# Patient Record
Sex: MALE
[De-identification: names, ages, dates, MRNs, and addresses within clinical notes are randomized per-mention and may not be internally consistent; named-entity substitution may affect disease eponyms.]

---

## 2017-10-30 ENCOUNTER — CHARTING TRANS (OUTPATIENT)
Dept: OTHER | Age: 47
End: 2017-10-30

## 2017-10-30 ASSESSMENT — PAIN SCALES - GENERAL: PAINLEVEL_OUTOF10: 0

## 2017-12-02 ENCOUNTER — LAB SERVICES (OUTPATIENT)
Dept: OTHER | Age: 47
End: 2017-12-02

## 2017-12-21 LAB
ALBUMIN SERPL-MCNC: 4 G/DL (ref 3.6–5.1)
ALBUMIN/GLOB SERPL: 1.1 (ref 1–2.4)
ALP SERPL-CCNC: 56 UNITS/L (ref 45–117)
ALT SERPL-CCNC: 36 UNITS/L
ANION GAP SERPL CALC-SCNC: 12 MMOL/L (ref 10–20)
APPEARANCE UR: CLEAR
AST SERPL-CCNC: 21 UNITS/L
BACTERIA #/AREA URNS HPF: ABNORMAL /HPF
BACTERIA UR CULT: NORMAL
BASOPHILS # BLD: 0 K/MCL (ref 0–0.3)
BASOPHILS NFR BLD: 0 %
BILIRUB SERPL-MCNC: 1.1 MG/DL (ref 0.2–1)
BILIRUB UR QL: NEGATIVE
BUN SERPL-MCNC: 11 MG/DL (ref 6–20)
BUN/CREAT SERPL: 16 (ref 7–25)
CALCIUM SERPL-MCNC: 8.7 MG/DL (ref 8.4–10.2)
CHLORIDE SERPL-SCNC: 104 MMOL/L (ref 98–107)
CHOLEST SERPL-MCNC: 201 MG/DL
CHOLEST/HDLC SERPL: 4.1
CO2 SERPL-SCNC: 27 MMOL/L (ref 21–32)
COLOR UR: ABNORMAL
CREAT SERPL-MCNC: 0.7 MG/DL (ref 0.67–1.17)
DIFFERENTIAL METHOD BLD: ABNORMAL
EOSINOPHIL # BLD: 0.1 K/MCL (ref 0.1–0.5)
EOSINOPHIL NFR BLD: 1 %
ERYTHROCYTE [DISTWIDTH] IN BLOOD: 13.7 % (ref 11–15)
GLOBULIN SER-MCNC: 3.6 G/DL (ref 2–4)
GLUCOSE SERPL-MCNC: 102 MG/DL (ref 65–99)
GLUCOSE UR-MCNC: NEGATIVE MG/DL
HDLC SERPL-MCNC: 49 MG/DL
HEMATOCRIT: 49 % (ref 39–51)
HEMOGLOBIN: 15.9 G/DL (ref 13–17)
HYALINE CASTS #/AREA URNS LPF: ABNORMAL /LPF (ref 0–5)
KETONES UR-MCNC: NEGATIVE MG/DL
LDLC SERPL CALC-MCNC: 132 MG/DL
LENGTH OF FAST TIME PATIENT: 12 HRS
LENGTH OF FAST TIME PATIENT: 12 HRS
LYMPHOCYTES # BLD: 2.2 K/MCL (ref 1–4.8)
LYMPHOCYTES NFR BLD: 30 %
MEAN CORPUSCULAR HEMOGLOBIN: 34.4 PG (ref 26–34)
MEAN CORPUSCULAR HGB CONC: 32.4 G/DL (ref 32–36.5)
MEAN CORPUSCULAR VOLUME: 106.1 FL (ref 78–100)
MONOCYTES # BLD: 0.7 K/MCL (ref 0.3–0.9)
MONOCYTES NFR BLD: 9 %
MUCOUS THREADS URNS QL MICRO: PRESENT
NEUTROPHILS # BLD: 4.5 K/MCL (ref 1.8–7.7)
NEUTROPHILS NFR BLD: 60 %
NITRITE UR QL: NEGATIVE
NONHDLC SERPL-MCNC: 152 MG/DL
PH UR: 5 UNITS (ref 5–7)
PLATELET COUNT: 187 K/MCL (ref 140–450)
POTASSIUM SERPL-SCNC: 4.5 MMOL/L (ref 3.4–5.1)
PROT UR QL: NEGATIVE MG/DL
RBC #/AREA URNS HPF: ABNORMAL /HPF (ref 0–3)
RBC-URINE: NEGATIVE
RED CELL COUNT: 4.62 MIL/MCL (ref 4.5–5.9)
SODIUM SERPL-SCNC: 138 MMOL/L (ref 135–145)
SP GR UR: 1.02 (ref 1–1.03)
SPECIMEN SOURCE: ABNORMAL
SQUAMOUS #/AREA URNS HPF: ABNORMAL /HPF (ref 0–5)
TOTAL PROTEIN: 7.6 G/DL (ref 6.4–8.2)
TRIGL SERPL-MCNC: 99 MG/DL
TSH SERPL-ACNC: 1.42 MCUNITS/ML (ref 0.35–5)
UROBILINOGEN UR QL: 0.2 MG/DL (ref 0–1)
WBC #/AREA URNS HPF: ABNORMAL /HPF (ref 0–5)
WBC-URINE: ABNORMAL
WHITE BLOOD COUNT: 7.6 K/MCL (ref 4.2–11)

## 2018-11-02 VITALS
OXYGEN SATURATION: 98 % | TEMPERATURE: 98.4 F | SYSTOLIC BLOOD PRESSURE: 131 MMHG | BODY MASS INDEX: 25.03 KG/M2 | RESPIRATION RATE: 16 BRPM | DIASTOLIC BLOOD PRESSURE: 87 MMHG | HEIGHT: 69 IN | WEIGHT: 169 LBS | HEART RATE: 82 BPM

## 2020-02-11 ENCOUNTER — APPOINTMENT (OUTPATIENT)
Dept: CT IMAGING | Facility: HOSPITAL | Age: 50
DRG: 439 | End: 2020-02-11
Attending: EMERGENCY MEDICINE
Payer: MEDICAID

## 2020-02-11 PROBLEM — K85.20 ALCOHOL-INDUCED ACUTE PANCREATITIS, UNSPECIFIED COMPLICATION STATUS: Status: ACTIVE | Noted: 2020-02-11

## 2020-02-11 PROCEDURE — 74177 CT ABD & PELVIS W/CONTRAST: CPT | Performed by: EMERGENCY MEDICINE

## 2020-02-11 NOTE — ED INITIAL ASSESSMENT (HPI)
Patient here with abd pain that started Sunday after drinking whiskey and has had pain since. Patient with history of pancreatitis. Patient taking Tylenol without pain relief.

## 2020-02-11 NOTE — ED NOTES
Patient states he cannot provide a urine specimen at this time, states he wants pain medication. IV established.

## 2020-02-11 NOTE — ED PROVIDER NOTES
Patient Seen in: [facility] Emergency Department      History   Patient presents with:  Abdomen/Flank Pain    Stated Complaint: abdominal pain    HPI    This is a pleasant 42-year-old male coming with complaints of abdominal pain.   This came a day a normal limits   CBC W/ DIFFERENTIAL - Abnormal; Notable for the following components:    MCH 35.8 (*)     RDW-SD 48.0 (*)     All other components within normal limits   LIPASE - Normal   CBC WITH DIFFERENTIAL WITH PLATELET    Narrative:      The following

## 2020-02-12 NOTE — PROGRESS NOTES
[facility]  Progress Note     Linda Bullock Patient Status:  Inpatient    1970 MRN [id number]   [facility] 3NE-A Attending Lencho Carter MD   Hosp Day # 0 PCP None Pcp     Chief Complaint: abd pain    S: Patient requiring mo of CLD- if tolerates then can advance to soft diet   2. IV fluids  3. IV pain medications as needed  4. IV Zofran PRN nausea vomiting  5. Advised to avoid alcoholic beverages  2.  Hypokalemia–replace        Quality:  · DVT Prophylaxis:  Lovenox  · CODE stat

## 2020-02-12 NOTE — PAYOR COMM NOTE
--------------  ADMISSION REVIEW     Payor: Adal Watson #:  [id number]  Authorization Number: [id number]    Admit date: 2/12/20  Admit time: 3614       Admitting Physician: Rm Wang MD  Attending Physician: Current:BP (!) 156/111   Pulse 70   Temp 98.4 °F (36.9 °C) (Temporal)   Resp 20   Ht 172 cm (5' 7.72\")   Wt 70.8 kg   SpO2 100%   BMI 23.93 kg/m²          Physical Exam    Alert and oriented patient appears uncomfortable HEENT exam is normal.  Lungs are c Current Discharge Medication List                   Present on Admission           ICD-10-CM Noted POA    Pancreatitis, acute K85.90 9/29/2014 Unknown                   Signed by Fletcher Almeida MD on 2/11/2020  6:43 PM            H&P - H&P Note      H&P reports that he has been smoking cigarettes. He has been smoking about 0.50 packs per day. He has never used smokeless tobacco. He reports current alcohol use. He reports that he does not use drugs.     Allergies:    Aspirin                 BLEEDING    Com CT abdomen and pelvis shows minimal peripancreatic inflammatory stranding suggestive of pancreatitis, hepatic steatosis    ASSESSMENT / PLAN:     1.  Acute pancreatitis with epigastric abdominal pain, alcohol induced pancreatitis  1. N.p.o.  2. IV fluids  3 Musculoskeletal: Moves all extremities.   Extremities: No edema.     Diagnostic Data:       Labs:       Recent Labs   Lab 02/11/20  1642 02/12/20  0730   WBC 8.8 6.6   HGB 16.2 13.9   MCV 98.9 102.0*   .0 163.0              Recent Labs   Lab 02/11/20 2/11/2020 2200 Given 40 mg Subcutaneous (Left Upper Abdomen) Yamileth Boyd RN      iohexol (OMNIPAQUE) 350 MG/ML injection 100 mL     Date Action Dose Route User    2/11/2020 1800 Given 83 mL Intravenous (Right Antecubital) Jerry Delgado      Ketolaverneac Tr Date Action Dose Route User    2/11/2020 8331 New Bag 1000 mL Intravenous Jon Chowdhury RN          REVIEWER COMMENTS:     OTHER:

## 2020-02-12 NOTE — PLAN OF CARE
A&Ox4. Language barrier, translating service used. Room air. No tele. BP elevated when in pain. Voids in bathroom, steady gait. PRN morphine Q2. PIV- IVF. No further needs. Staff will continue to monitor.       Problem: PAIN - ADULT  Goal: Verbalizes/displa understand and participate in their care  Description  Interventions:  - Assess communication ability and preferred communication style  - Implement communication aides and strategies  - Use visual cues when possible  - Listen attentively, be patient, do n

## 2020-02-12 NOTE — H&P
[facility]                                                               History & Physical         Linda Bullock Patient Status:  Emergency    1970 MRN [id number]   Location [address] [facility] Attending Laurie Smith, No JVD. No carotid bruits. Respiratory: Clear to auscultation bilaterally. No wheezes. No rhonchi. Cardiovascular: S1, S2.  Regular rate and rhythm. No murmurs. Equal pulses   Abdomen: Soft, epigastric tenderness, nondistended. Positive bowel sounds.

## 2020-02-12 NOTE — PLAN OF CARE
Assumed care at 0730. Pt a/ox4, VSS, on RA. Pt with c/o mid/upper ab pain 7/10, receiving PRN pain meds q2h. No c/o n/v/d, SOB, dizziness/lightheadedness. NPO with ice chips maintained. IVF running 0.9NS at 100mL/hr. Potassium replaced per protocol.  Pt upd appropriate  - Identify discharge learning needs (meds, wound care, etc)  - Arrange for interpreters to assist at discharge as needed  - Consider post-discharge preferences of patient/family/discharge partner  - Complete POLST form as appropriate  - Assess

## 2020-02-13 NOTE — DISCHARGE SUMMARY
[facility]  DISCHARGE SUMMARY     Rocio  Patient Status:  Inpatient    1970 MRN [id number]   [facility] 3NE-A Attending No att. providers found   Hosp Day # 1 PCP None Pcp     Date of Admission: 2020  Date of Disc STRENGTH      Take 500 mg by mouth every 6 (six) hours as needed for Pain.    Refills:  0            [place]    Follow-up appointment:   Your primary care doctor    Schedule an appointment as soon as possible for a visit in 1 week      Appointmen

## 2020-02-13 NOTE — PLAN OF CARE
Assumed care at 0730. Pt a/ox4, VSS, on RA. Pt with no pain this AM. No n/v/d, SOB, dizziness/lightheadedness. IVF decreased, now running 0.9NS at 100mL/hr. Diet advanced to clear liquids.  line used to communicate with pt.  Will continue to [facility]

## 2020-02-13 NOTE — PLAN OF CARE
Patient is A&O x4. [place] speaking. VSS. On RA. IV-0.9% 150 ml/hr. C/o abdominal pain, toradol prn given per MAR. Ativan prn given per MAR.   NPO, unable to tolerate CLD diet. Electrolyte protocol.   Benadryl prn given for sleep, pt states it singh ability or social support system  Outcome: Progressing     Problem: Altered Communication/Language Barrier  Goal: Patient/Family is able to understand and participate in their care  Description  Interventions:  - Assess communication ability and preferred

## 2020-02-14 ENCOUNTER — PATIENT OUTREACH (OUTPATIENT)
Dept: CASE MANAGEMENT | Age: 50
End: 2020-02-14

## 2020-02-14 DIAGNOSIS — Z02.9 ENCOUNTERS FOR ADMINISTRATIVE PURPOSE: ICD-10-CM

## 2020-02-14 PROCEDURE — 1111F DSCHRG MED/CURRENT MED MERGE: CPT

## 2020-02-14 NOTE — PROGRESS NOTES
Initial Post Discharge Follow Up   Discharge Date: 2/13/20  Contact Date: 2/14/2020    Consent Verification:  Assessment Completed With: Patient via [employer]  TIR([id number])  HIPAA Verified?   Yes    Discharge Dx:  Alcohol-induced acute pancreatiti at D/C? No        Needs post D/C:   Now that you are home, are there any needs or concerns you need addressed before your next visit with your PCP?  (DME, meds, disease concerns, Etc): No     Follow up appointments:      Your appointments     Date & Time Ap

## 2023-06-11 NOTE — PROGRESS NOTES
NURSING DISCHARGE NOTE    Discharged Home via Ambulatory. Accompanied by Pt refused transport assistance. Walked out without informing RN  Belongings Taken by patient/family. Pt discharged home. Discharge paperwork given to pt.   line use Pt in ultrasound

## (undated) NOTE — Clinical Note
FYI, Non TCM appt 2/20/2020. HFU template completed.  [facility]